# Patient Record
Sex: MALE | Race: WHITE | Employment: OTHER | URBAN - METROPOLITAN AREA
[De-identification: names, ages, dates, MRNs, and addresses within clinical notes are randomized per-mention and may not be internally consistent; named-entity substitution may affect disease eponyms.]

---

## 2021-08-30 ENCOUNTER — HOSPITAL ENCOUNTER (OUTPATIENT)
Dept: CT IMAGING | Age: 66
Discharge: HOME | End: 2021-08-30
Payer: MEDICARE

## 2021-08-30 ENCOUNTER — HOSPITAL ENCOUNTER (OUTPATIENT)
Age: 66
Discharge: HOME | End: 2021-08-30
Payer: MEDICARE

## 2021-08-30 DIAGNOSIS — R31.0 GROSS HEMATURIA: ICD-10-CM

## 2021-08-30 LAB
BUN BLDV-MCNC: 21 MG/DL (ref 7–20)
CREAT SERPL-MCNC: 1.1 MG/DL (ref 0.8–1.3)
GFR AFRICAN AMERICAN: >60
GFR NON-AFRICAN AMERICAN: >60

## 2021-08-30 PROCEDURE — 84520 ASSAY OF UREA NITROGEN: CPT

## 2021-08-30 PROCEDURE — 6360000004 HC RX CONTRAST MEDICATION: Performed by: UROLOGY

## 2021-08-30 PROCEDURE — 36415 COLL VENOUS BLD VENIPUNCTURE: CPT

## 2021-08-30 PROCEDURE — 82565 ASSAY OF CREATININE: CPT

## 2021-08-30 PROCEDURE — 74178 CT ABD&PLV WO CNTR FLWD CNTR: CPT

## 2021-08-30 RX ADMIN — IOPAMIDOL 75 ML: 755 INJECTION, SOLUTION INTRAVENOUS at 07:36

## 2022-02-22 ENCOUNTER — HOSPITAL ENCOUNTER (OUTPATIENT)
Dept: CT IMAGING | Age: 67
Discharge: HOME | End: 2022-02-22
Payer: MEDICARE

## 2022-02-22 DIAGNOSIS — N28.89 URETERITIS: ICD-10-CM

## 2022-02-22 DIAGNOSIS — S37.009A: ICD-10-CM

## 2022-02-22 DIAGNOSIS — I10 ESSENTIAL HYPERTENSION, MALIGNANT: ICD-10-CM

## 2022-02-22 PROCEDURE — 6360000004 HC RX CONTRAST MEDICATION: Performed by: UROLOGY

## 2022-02-22 PROCEDURE — 74170 CT ABD WO CNTRST FLWD CNTRST: CPT

## 2022-02-22 RX ADMIN — IOPAMIDOL 75 ML: 755 INJECTION, SOLUTION INTRAVENOUS at 15:47

## 2022-03-16 ENCOUNTER — ANESTHESIA EVENT (OUTPATIENT)
Dept: OPERATING ROOM | Age: 67
End: 2022-03-16
Payer: MEDICARE

## 2022-03-16 RX ORDER — ASPIRIN 81 MG/1
81 TABLET, CHEWABLE ORAL DAILY
COMMUNITY

## 2022-03-16 NOTE — PROGRESS NOTES

## 2022-03-16 NOTE — PROGRESS NOTES
Kade Contreras Preoperative Screening for Elective Surgery/Invasive Procedures While COVID-19 present in the community     Have you had any of the following symptoms? o Fever, chills  o Cough  o Shortness of breath  o Muscle aches/pain  o Diarrhea  o Abdominal pain, nausea, vomiting  o Loss or decrease in taste and / or smell   Risk of Exposure  o Have you recently been hospitalized for COVID-19 or flu-like illness, if so when?  o Recently diagnosed with COVID-19, if so when?  o Recently tested for COVID-19, if so when?  o Have you been in close contact with a person or family member who currently has or recently had COVID-19? If yes, when and in what context?  o Do you live with anybody who in the last 14 days has had fever, chills, shortness of breath, muscle aches, flu-like illness?  o Do you have any close contacts or family members who are currently in the hospital for COVID-19 or flu-like illness? If yes, assess recent close contact with this person. Indicate if the patient has a positive screen by answering yes to one or more of the above questions. Patients who test positive or screen positive prior to surgery or on the day of surgery should be evaluated in conjunction with the surgeon/proceduralist/anesthesiologist to determine the urgency of the procedure.

## 2022-03-22 ENCOUNTER — ANESTHESIA (OUTPATIENT)
Dept: OPERATING ROOM | Age: 67
End: 2022-03-22
Payer: MEDICARE

## 2022-03-22 ENCOUNTER — HOSPITAL ENCOUNTER (OUTPATIENT)
Age: 67
Setting detail: OUTPATIENT SURGERY
Discharge: HOME | End: 2022-03-22
Attending: UROLOGY | Admitting: UROLOGY
Payer: MEDICARE

## 2022-03-22 VITALS
HEIGHT: 67 IN | OXYGEN SATURATION: 92 % | WEIGHT: 165 LBS | SYSTOLIC BLOOD PRESSURE: 111 MMHG | HEART RATE: 72 BPM | DIASTOLIC BLOOD PRESSURE: 76 MMHG | BODY MASS INDEX: 25.9 KG/M2 | RESPIRATION RATE: 7 BRPM | TEMPERATURE: 97.2 F

## 2022-03-22 VITALS — SYSTOLIC BLOOD PRESSURE: 112 MMHG | OXYGEN SATURATION: 98 % | DIASTOLIC BLOOD PRESSURE: 62 MMHG

## 2022-03-22 PROBLEM — D41.4 NEOPLASM OF UNCERTAIN BEHAVIOR OF LATERAL WALL OF URINARY BLADDER: Status: ACTIVE | Noted: 2022-03-22

## 2022-03-22 PROCEDURE — 2709999900 HC NON-CHARGEABLE SUPPLY: Performed by: UROLOGY

## 2022-03-22 PROCEDURE — 7100000001 HC PACU RECOVERY - ADDTL 15 MIN: Performed by: UROLOGY

## 2022-03-22 PROCEDURE — 3700000001 HC ADD 15 MINUTES (ANESTHESIA): Performed by: UROLOGY

## 2022-03-22 PROCEDURE — 6360000002 HC RX W HCPCS: Performed by: NURSE ANESTHETIST, CERTIFIED REGISTERED

## 2022-03-22 PROCEDURE — 7100000011 HC PHASE II RECOVERY - ADDTL 15 MIN: Performed by: UROLOGY

## 2022-03-22 PROCEDURE — 2500000003 HC RX 250 WO HCPCS: Performed by: NURSE ANESTHETIST, CERTIFIED REGISTERED

## 2022-03-22 PROCEDURE — 88307 TISSUE EXAM BY PATHOLOGIST: CPT

## 2022-03-22 PROCEDURE — 2500000003 HC RX 250 WO HCPCS: Performed by: ANESTHESIOLOGY

## 2022-03-22 PROCEDURE — 3600000004 HC SURGERY LEVEL 4 BASE: Performed by: UROLOGY

## 2022-03-22 PROCEDURE — 6360000002 HC RX W HCPCS: Performed by: UROLOGY

## 2022-03-22 PROCEDURE — 2580000003 HC RX 258: Performed by: ANESTHESIOLOGY

## 2022-03-22 PROCEDURE — 3600000014 HC SURGERY LEVEL 4 ADDTL 15MIN: Performed by: UROLOGY

## 2022-03-22 PROCEDURE — 3700000000 HC ANESTHESIA ATTENDED CARE: Performed by: UROLOGY

## 2022-03-22 PROCEDURE — 7100000010 HC PHASE II RECOVERY - FIRST 15 MIN: Performed by: UROLOGY

## 2022-03-22 PROCEDURE — 7100000000 HC PACU RECOVERY - FIRST 15 MIN: Performed by: UROLOGY

## 2022-03-22 RX ORDER — SODIUM CHLORIDE 0.9 % (FLUSH) 0.9 %
5-40 SYRINGE (ML) INJECTION EVERY 12 HOURS SCHEDULED
Status: DISCONTINUED | OUTPATIENT
Start: 2022-03-22 | End: 2022-03-22 | Stop reason: HOSPADM

## 2022-03-22 RX ORDER — PROPOFOL 10 MG/ML
INJECTION, EMULSION INTRAVENOUS PRN
Status: DISCONTINUED | OUTPATIENT
Start: 2022-03-22 | End: 2022-03-22 | Stop reason: SDUPTHER

## 2022-03-22 RX ORDER — FENTANYL CITRATE 50 UG/ML
INJECTION, SOLUTION INTRAMUSCULAR; INTRAVENOUS PRN
Status: DISCONTINUED | OUTPATIENT
Start: 2022-03-22 | End: 2022-03-22 | Stop reason: SDUPTHER

## 2022-03-22 RX ORDER — ONDANSETRON 2 MG/ML
INJECTION INTRAMUSCULAR; INTRAVENOUS PRN
Status: DISCONTINUED | OUTPATIENT
Start: 2022-03-22 | End: 2022-03-22 | Stop reason: SDUPTHER

## 2022-03-22 RX ORDER — ONDANSETRON 2 MG/ML
4 INJECTION INTRAMUSCULAR; INTRAVENOUS
Status: DISCONTINUED | OUTPATIENT
Start: 2022-03-22 | End: 2022-03-22 | Stop reason: HOSPADM

## 2022-03-22 RX ORDER — SODIUM CHLORIDE 9 MG/ML
25 INJECTION, SOLUTION INTRAVENOUS PRN
Status: DISCONTINUED | OUTPATIENT
Start: 2022-03-22 | End: 2022-03-22 | Stop reason: HOSPADM

## 2022-03-22 RX ORDER — SODIUM CHLORIDE, SODIUM LACTATE, POTASSIUM CHLORIDE, CALCIUM CHLORIDE 600; 310; 30; 20 MG/100ML; MG/100ML; MG/100ML; MG/100ML
INJECTION, SOLUTION INTRAVENOUS CONTINUOUS
Status: DISCONTINUED | OUTPATIENT
Start: 2022-03-22 | End: 2022-03-22 | Stop reason: HOSPADM

## 2022-03-22 RX ORDER — SODIUM CHLORIDE 0.9 % (FLUSH) 0.9 %
10 SYRINGE (ML) INJECTION EVERY 12 HOURS SCHEDULED
Status: DISCONTINUED | OUTPATIENT
Start: 2022-03-22 | End: 2022-03-22 | Stop reason: HOSPADM

## 2022-03-22 RX ORDER — ROCURONIUM BROMIDE 10 MG/ML
INJECTION, SOLUTION INTRAVENOUS PRN
Status: DISCONTINUED | OUTPATIENT
Start: 2022-03-22 | End: 2022-03-22 | Stop reason: SDUPTHER

## 2022-03-22 RX ORDER — MEPERIDINE HYDROCHLORIDE 25 MG/ML
12.5 INJECTION INTRAMUSCULAR; INTRAVENOUS; SUBCUTANEOUS EVERY 5 MIN PRN
Status: DISCONTINUED | OUTPATIENT
Start: 2022-03-22 | End: 2022-03-22 | Stop reason: HOSPADM

## 2022-03-22 RX ORDER — DEXAMETHASONE SODIUM PHOSPHATE 4 MG/ML
INJECTION, SOLUTION INTRA-ARTICULAR; INTRALESIONAL; INTRAMUSCULAR; INTRAVENOUS; SOFT TISSUE PRN
Status: DISCONTINUED | OUTPATIENT
Start: 2022-03-22 | End: 2022-03-22 | Stop reason: SDUPTHER

## 2022-03-22 RX ORDER — SODIUM CHLORIDE 0.9 % (FLUSH) 0.9 %
10 SYRINGE (ML) INJECTION PRN
Status: DISCONTINUED | OUTPATIENT
Start: 2022-03-22 | End: 2022-03-22 | Stop reason: HOSPADM

## 2022-03-22 RX ORDER — LIDOCAINE HYDROCHLORIDE 20 MG/ML
INJECTION, SOLUTION INFILTRATION; PERINEURAL PRN
Status: DISCONTINUED | OUTPATIENT
Start: 2022-03-22 | End: 2022-03-22 | Stop reason: SDUPTHER

## 2022-03-22 RX ORDER — LIDOCAINE HYDROCHLORIDE 10 MG/ML
1 INJECTION, SOLUTION EPIDURAL; INFILTRATION; INTRACAUDAL; PERINEURAL
Status: DISCONTINUED | OUTPATIENT
Start: 2022-03-22 | End: 2022-03-22 | Stop reason: HOSPADM

## 2022-03-22 RX ORDER — SODIUM CHLORIDE 0.9 % (FLUSH) 0.9 %
5-40 SYRINGE (ML) INJECTION PRN
Status: DISCONTINUED | OUTPATIENT
Start: 2022-03-22 | End: 2022-03-22 | Stop reason: HOSPADM

## 2022-03-22 RX ADMIN — LIDOCAINE HYDROCHLORIDE 80 MG: 20 INJECTION, SOLUTION INFILTRATION; PERINEURAL at 07:59

## 2022-03-22 RX ADMIN — PROPOFOL 150 MG: 10 INJECTION, EMULSION INTRAVENOUS at 08:00

## 2022-03-22 RX ADMIN — ONDANSETRON HYDROCHLORIDE 4 MG: 2 INJECTION, SOLUTION INTRAMUSCULAR; INTRAVENOUS at 08:05

## 2022-03-22 RX ADMIN — Medication 2000 MG: at 08:00

## 2022-03-22 RX ADMIN — SUGAMMADEX 100 MG: 100 INJECTION, SOLUTION INTRAVENOUS at 08:33

## 2022-03-22 RX ADMIN — FENTANYL CITRATE 50 MCG: 50 INJECTION INTRAMUSCULAR; INTRAVENOUS at 07:59

## 2022-03-22 RX ADMIN — ROCURONIUM BROMIDE 40 MG: 10 INJECTION, SOLUTION INTRAVENOUS at 08:00

## 2022-03-22 RX ADMIN — FENTANYL CITRATE 25 MCG: 50 INJECTION INTRAMUSCULAR; INTRAVENOUS at 08:29

## 2022-03-22 RX ADMIN — SODIUM CHLORIDE, POTASSIUM CHLORIDE, SODIUM LACTATE AND CALCIUM CHLORIDE: 600; 310; 30; 20 INJECTION, SOLUTION INTRAVENOUS at 06:51

## 2022-03-22 RX ADMIN — SUGAMMADEX 100 MG: 100 INJECTION, SOLUTION INTRAVENOUS at 08:34

## 2022-03-22 RX ADMIN — FAMOTIDINE 20 MG: 10 INJECTION INTRAVENOUS at 07:03

## 2022-03-22 RX ADMIN — DEXAMETHASONE SODIUM PHOSPHATE 4 MG: 4 INJECTION, SOLUTION INTRAMUSCULAR; INTRAVENOUS at 08:05

## 2022-03-22 RX ADMIN — FENTANYL CITRATE 25 MCG: 50 INJECTION INTRAMUSCULAR; INTRAVENOUS at 08:17

## 2022-03-22 RX ADMIN — SODIUM CHLORIDE, POTASSIUM CHLORIDE, SODIUM LACTATE AND CALCIUM CHLORIDE: 600; 310; 30; 20 INJECTION, SOLUTION INTRAVENOUS at 07:55

## 2022-03-22 ASSESSMENT — PULMONARY FUNCTION TESTS
PIF_VALUE: 18
PIF_VALUE: 23
PIF_VALUE: 18
PIF_VALUE: 20
PIF_VALUE: 11
PIF_VALUE: 1
PIF_VALUE: 2
PIF_VALUE: 23
PIF_VALUE: 18
PIF_VALUE: 1
PIF_VALUE: 1
PIF_VALUE: 17
PIF_VALUE: 21
PIF_VALUE: 18
PIF_VALUE: 18
PIF_VALUE: 0
PIF_VALUE: 21
PIF_VALUE: 18
PIF_VALUE: 18
PIF_VALUE: 1
PIF_VALUE: 2
PIF_VALUE: 2
PIF_VALUE: 20
PIF_VALUE: 18
PIF_VALUE: 1
PIF_VALUE: 19
PIF_VALUE: 14
PIF_VALUE: 18
PIF_VALUE: 23
PIF_VALUE: 0
PIF_VALUE: 19
PIF_VALUE: 18
PIF_VALUE: 19
PIF_VALUE: 18
PIF_VALUE: 20
PIF_VALUE: 18
PIF_VALUE: 5
PIF_VALUE: 17
PIF_VALUE: 19
PIF_VALUE: 18
PIF_VALUE: 21

## 2022-03-22 ASSESSMENT — ENCOUNTER SYMPTOMS: SHORTNESS OF BREATH: 1

## 2022-03-22 ASSESSMENT — PAIN - FUNCTIONAL ASSESSMENT: PAIN_FUNCTIONAL_ASSESSMENT: 0-10

## 2022-03-22 ASSESSMENT — LIFESTYLE VARIABLES: SMOKING_STATUS: 1

## 2022-03-22 NOTE — PROGRESS NOTES
Reported to Dr Padilla Standard pt's heart rhythm strip has ST depression but in NSR. Pt asymptomatic and denies chest pain. Compared with previous EKG. No new orders received. Will continue to monitor.

## 2022-03-22 NOTE — ANESTHESIA POSTPROCEDURE EVALUATION
Department of Anesthesiology  Postprocedure Note    Patient: Sonia Li  MRN: [id number]  YOB: 1955  Date of evaluation: 3/22/2022  Time:  10:01 AM     Procedure Summary     Date: 03/22/22 Room / Location: [address] [place] [address]    Anesthesia Start: 3760 Anesthesia Stop: Meredith Felling    Procedure: CYSTOSCOPY TRANSURETHRAL RESECTION BLADDER TUMOR (N/A Bladder) Diagnosis: (GROSS HEMATURIA)    Surgeons: Sosa Richard MD Responsible Provider: Arron Pina MD    Anesthesia Type: general ASA Status: 2          Anesthesia Type: general    Connor Phase I: Connor Score: 10    Connor Phase II: Connor Score: 10    Last vitals: Reviewed and per EMR flowsheets.      Vitals:    03/22/22 0909 03/22/22 0913 03/22/22 0919 03/22/22 0943   BP:  135/87 (!) 127/94 111/76   Pulse: 64 63 60 72   Resp:  15 11 (!) 7   Temp:       TempSrc:       SpO2:  98% 98% 92%   Weight:       Height:         Anesthesia Post Evaluation    Patient location during evaluation: bedside  Patient participation: complete - patient participated  Level of consciousness: awake and alert  Airway patency: patent  Nausea & Vomiting: no nausea  Complications: no  Cardiovascular status: hemodynamically stable  Respiratory status: acceptable  Hydration status: euvolemic

## 2022-03-22 NOTE — INTERVAL H&P NOTE
Update History & Physical    The patient's History and Physical was reviewed with the patient and I examined the patient. There was no change. The surgical site was confirmed by the patient and me. Plan: The risks, benefits, expected outcome, and alternative to the recommended procedure have been discussed with the patient. Patient understands and wants to proceed with the procedure.      R/b/a cysto, turbt (see office notes as well)    Electronically signed by Leatha Knott MD on 3/22/2022 at 7:57 AM

## 2022-03-22 NOTE — ANESTHESIA PRE PROCEDURE
Department of Anesthesiology  Preprocedure Note       Name:  Melquiades Combs   Age:  73 y.o.  :  1955                                          MRN:  [id number]         Date:  3/22/2022      Surgeon: Eliza Quigley):  Le Rand MD    Procedure: Procedure(s):  CYSTOSCOPY TRANSURETHRAL RESECTION BLADDER TUMOR    Medications prior to admission:   Prior to Admission medications    Medication Sig Start Date End Date Taking? Authorizing Provider   aspirin 81 MG chewable tablet Take 81 mg by mouth daily   Yes Historical Provider, MD   simvastatin (ZOCOR) 20 MG tablet Take 20 mg by mouth nightly    Historical Provider, MD   carvedilol (COREG) 6.25 MG tablet Take 6.25 mg by mouth 2 times daily (with meals). Historical Provider, MD   nitroGLYCERIN (NITROSTAT) 0.4 MG SL tablet Place 0.4 mg under the tongue every 5 minutes as needed. Patient not taking: Reported on 3/22/2022    Historical Provider, MD   lisinopril (PRINIVIL;ZESTRIL) 10 MG tablet Take 40 mg by mouth daily     Historical Provider, MD       Current medications:    Current Facility-Administered Medications   Medication Dose Route Frequency Provider Last Rate Last Admin    ceFAZolin (ANCEF) 2000 mg in sterile water 20 mL IV syringe  2,000 mg IntraVENous Once Le Rand MD        lidocaine PF 1 % injection 1 mL  1 mL IntraDERmal Once PRN Amy Ramires MD        lactated ringers infusion   IntraVENous Continuous Amy Ramires  mL/hr at 22 0651 New Bag at 22 0651    sodium chloride flush 0.9 % injection 10 mL  10 mL IntraVENous 2 times per day Amy Ramires MD        sodium chloride flush 0.9 % injection 10 mL  10 mL IntraVENous PRN Amy Ramires MD        0.9 % sodium chloride infusion  25 mL IntraVENous PRN Amy Ramires MD           Allergies:  No Known Allergies    Problem List:  There is no problem list on file for this patient.       Past Medical History:        Diagnosis Date    Hyperlipidemia     Hypertension     MI (myocardial infarction) ([facility] [address] 2008       Past Surgical History:        Procedure Laterality Date    APPENDECTOMY      CORONARY ARTERY BYPASS GRAFT  2008    HERNIA REPAIR         Social History:    Social History     Tobacco Use    Smoking status: Light Tobacco Smoker     Types: Cigarettes    Smokeless tobacco: Current User    Tobacco comment: occassional    Substance Use Topics    Alcohol use: Yes     Comment: occassional                                 Ready to quit: Not Answered  Counseling given: Not Answered  Comment: occassional       Vital Signs (Current):   Vitals:    03/16/22 1108 03/22/22 0640   BP:  121/76   Pulse:  70   Resp:  20   Temp:  98 °F (36.7 °C)   TempSrc:  Infrared   SpO2:  98%   Weight: 165 lb (74.8 kg)    Height: 5' 7\" (1.702 m)                                               BP Readings from Last 3 Encounters:   03/22/22 121/76   04/20/18 122/80       NPO Status: Time of last liquid consumption: 2100                        Time of last solid consumption: 2100                        Date of last liquid consumption: 03/21/22                        Date of last solid food consumption: 03/21/22    BMI:   Wt Readings from Last 3 Encounters:   03/16/22 165 lb (74.8 kg)   04/20/18 170 lb (77.1 kg)   01/11/11 167 lb (75.8 kg)     Body mass index is 25.84 kg/m².     CBC:   Lab Results   Component Value Date    WBC 7.1 04/20/2018    RBC 4.32 04/20/2018    HGB 14.9 04/20/2018    HCT 44.1 04/20/2018    .0 04/20/2018    RDW 13.5 04/20/2018     04/20/2018       CMP:   Lab Results   Component Value Date     04/20/2018    K 4.5 04/20/2018     04/20/2018    CO2 27 04/20/2018    BUN 21 08/30/2021    CREATININE 1.1 08/30/2021    GFRAA >60 08/30/2021    GFRAA >60 01/29/2012    AGRATIO 1.8 04/20/2018    LABGLOM >60 08/30/2021    GLUCOSE 105 04/20/2018    PROT 7.1 04/20/2018    PROT 7.3 01/29/2012    CALCIUM 9.2 04/20/2018    BILITOT <0.2 04/20/2018    ALKPHOS 88 04/20/2018    AST 16 04/20/2018    ALT 19 04/20/2018       POC Tests: No results for input(s): POCGLU, POCNA, POCK, POCCL, POCBUN, POCHEMO, POCHCT in the last 72 hours. Coags:   Lab Results   Component Value Date    PROTIME 10.3 04/20/2018    INR 0.91 04/20/2018       HCG (If Applicable): No results found for: PREGTESTUR, PREGSERUM, HCG, HCGQUANT     ABGs: No results found for: PHART, PO2ART, VAN3DGV, NHU8TDG, BEART, C3WLNUDY     Type & Screen (If Applicable):  No results found for: LABABO, LABRH    Drug/Infectious Status (If Applicable):  No results found for: HIV, HEPCAB    COVID-19 Screening (If Applicable): No results found for: COVID19        Anesthesia Evaluation  Patient summary reviewed no history of anesthetic complications:   Airway: Mallampati: II  TM distance: >3 FB   Neck ROM: full  Mouth opening: > = 3 FB Dental:    (+) lower dentures and upper dentures      Pulmonary: breath sounds clear to auscultation  (+) COPD (NO O2 REQ., NO INHALERS):  shortness of breath (EXERT):  current smoker (1+ PPD)    (-) asthma and sleep apnea          Patient did not smoke on day of surgery. Cardiovascular:    (+) hypertension:, past MI (2008): > 6 months, CAD: obstructive, CABG/stent (3V CABG, 2008):, LOZADA:,     (-) pacemaker, dysrhythmias,  angina and  CHF    ECG reviewed  Rhythm: regular  Rate: normal           Beta Blocker:  Dose within 24 Hrs         Neuro/Psych:      (-) seizures, TIA, CVA and psychiatric history           GI/Hepatic/Renal:   (+) renal disease (BENIGN RENAL MASS, BX'D //  HEMATURIA):,      (-) GERD, liver disease, bowel prep and no morbid obesity       Endo/Other:    (+) : arthritis:., no malignancy/cancer. (-) diabetes mellitus, hypothyroidism, hyperthyroidism, blood dyscrasia, no malignancy/cancer               Abdominal:       Abdomen: soft. Vascular: negative vascular ROS.          Other Findings:             Anesthesia Plan      general     ASA 2       Induction: intravenous. MIPS: Postoperative opioids intended and Prophylactic antiemetics administered. Anesthetic plan and risks discussed with patient. Plan discussed with CRNA. This pre-anesthesia assessment may be used as a history and physical.    DOS STAFF ADDENDUM:    Pt seen and examined, chart reviewed (including anesthesia, drug and allergy history). No interval changes to history and physical examination. Anesthetic plan, risks, benefits, alternatives, and personnel involved discussed with patient. Patient verbalized an understanding and agrees to proceed.       Ana Cobb MD  March 22, 2022  6:57 AM      Ana Cobb MD   3/22/2022

## 2022-03-22 NOTE — PROGRESS NOTES
Received report from Memolane, DIAN and Castro Mejia RN. VSS with Sp02 98% on r/a. Pt awake and denies pain and nausea. Will continue to monitor.

## 2022-03-22 NOTE — OP NOTE
Operative Note      Patient: Tayler Saucedo  YOB: 1955  MRN: [id number]    Date of Procedure: 3/22/2022    Pre-Operative Diagnosis:  Hist of abnormal CT, gross hematuria  Post-Operative Diagnosis: same     Procedure Performed:   1. Transurethral resection of bladder tumor (medium ~3cm total surface area)        Surgeon:  Alejandra Ashley MD    Assistant: Scrub    Anesthesia: General    Drains/Tubes: none  Specimens: (1) superficial and deep bladder tissue  Intravenous Fluids: 500 mL   Estimated Blood Loss: 10 mL  Complications: None    Findings: 3 cm papillary bladder tumor just lateral to right UO. I saw 1 total tumors. It appeared low grade grade. The anterior urethra was within normal limits. Prostatic urethra showed some mild hypertrophy, small median bar  STEF - nl    Indications:  Hist of renal lesion, stable after fall. Bladder mass seen on ct and gross hematuria. Risks, benefits, alternatives of the procedure were reviewed. See the office notes for further details. The patient signed the consent prior to surgery. Description of Procedure:  After obtaining informed consent, the patient was brought to the operating room and placed supine on the operating room table. After adequate anesthesia, he was then repositioned in the dorsal lithotomy position and prepped and draped in the standard surgical fashion. I began the case by first doing rigid cystoscopy with a 21-Frisian sheath and a 30 degree lens. The anterior urethra was within normal limits. The prostatic urethra showed some hypertrophy. Once in the bladder,  His ureteral orifices were in normal orthotopic position. l found a tumor around 3cm in size as above. I also examined the bladder with a 70 degree lens and did not find any additional tumors or other pathology. The cystoscope was then removed and exchanged for  the resectoscope. I used the wire loop as my working element.  I began resection of the bladder tumor using monopolar electrocautery at a setting of 60 for the cutting current and 50 for coagulation current. The superficial aspects of the tumor were debulked with the resectoscope  And then I performed deeper tissue resection in order to get underneath the tumor and to sample muscle tissue. I then focused on using cautery to fulgurate the tumor base to minimize the chance of post-operative bleeding. There was no active bleeding at the conclusion of the case. The patient was then awakened from anesthesia and brought to the PACU in stable condition. There were no apparent complications.         Follow up:   - 1-2 weeks for postop check and pathology results       Electronically signed by Mirela Esposito MD on 3/22/2022 at 11:37 AM

## 2022-08-05 ENCOUNTER — HOSPITAL ENCOUNTER (OUTPATIENT)
Dept: ULTRASOUND IMAGING | Age: 67
Discharge: HOME | End: 2022-08-05
Payer: MEDICARE

## 2022-08-05 DIAGNOSIS — R10.11 ABDOMINAL PAIN, RUQ (RIGHT UPPER QUADRANT): ICD-10-CM

## 2022-08-05 PROCEDURE — 76705 ECHO EXAM OF ABDOMEN: CPT

## 2022-10-19 ENCOUNTER — ANESTHESIA EVENT (OUTPATIENT)
Dept: OPERATING ROOM | Age: 67
End: 2022-10-19
Payer: MEDICARE

## 2022-10-19 ENCOUNTER — APPOINTMENT (OUTPATIENT)
Dept: GENERAL RADIOLOGY | Age: 67
End: 2022-10-19
Attending: ORTHOPAEDIC SURGERY
Payer: MEDICARE

## 2022-10-19 ENCOUNTER — ANESTHESIA (OUTPATIENT)
Dept: OPERATING ROOM | Age: 67
End: 2022-10-19
Payer: MEDICARE

## 2022-10-19 ENCOUNTER — HOSPITAL ENCOUNTER (OUTPATIENT)
Age: 67
Setting detail: OUTPATIENT SURGERY
Discharge: HOME | End: 2022-10-19
Attending: ORTHOPAEDIC SURGERY | Admitting: ORTHOPAEDIC SURGERY
Payer: MEDICARE

## 2022-10-19 VITALS
WEIGHT: 155 LBS | HEIGHT: 67 IN | HEART RATE: 69 BPM | SYSTOLIC BLOOD PRESSURE: 132 MMHG | BODY MASS INDEX: 24.33 KG/M2 | TEMPERATURE: 96.2 F | RESPIRATION RATE: 15 BRPM | DIASTOLIC BLOOD PRESSURE: 75 MMHG | OXYGEN SATURATION: 99 %

## 2022-10-19 DIAGNOSIS — S82.841A CLOSED BIMALLEOLAR FRACTURE OF RIGHT ANKLE, INITIAL ENCOUNTER: Primary | ICD-10-CM

## 2022-10-19 PROCEDURE — 2580000003 HC RX 258: Performed by: ORTHOPAEDIC SURGERY

## 2022-10-19 PROCEDURE — 3600000004 HC SURGERY LEVEL 4 BASE: Performed by: ORTHOPAEDIC SURGERY

## 2022-10-19 PROCEDURE — 7100000011 HC PHASE II RECOVERY - ADDTL 15 MIN: Performed by: ORTHOPAEDIC SURGERY

## 2022-10-19 PROCEDURE — 6360000002 HC RX W HCPCS

## 2022-10-19 PROCEDURE — 3600000014 HC SURGERY LEVEL 4 ADDTL 15MIN: Performed by: ORTHOPAEDIC SURGERY

## 2022-10-19 PROCEDURE — 73600 X-RAY EXAM OF ANKLE: CPT

## 2022-10-19 PROCEDURE — 6370000000 HC RX 637 (ALT 250 FOR IP): Performed by: ORTHOPAEDIC SURGERY

## 2022-10-19 PROCEDURE — 6360000002 HC RX W HCPCS: Performed by: ORTHOPAEDIC SURGERY

## 2022-10-19 PROCEDURE — 2720000010 HC SURG SUPPLY STERILE: Performed by: ORTHOPAEDIC SURGERY

## 2022-10-19 PROCEDURE — 6370000000 HC RX 637 (ALT 250 FOR IP)

## 2022-10-19 PROCEDURE — 3700000001 HC ADD 15 MINUTES (ANESTHESIA): Performed by: ORTHOPAEDIC SURGERY

## 2022-10-19 PROCEDURE — 7100000000 HC PACU RECOVERY - FIRST 15 MIN: Performed by: ORTHOPAEDIC SURGERY

## 2022-10-19 PROCEDURE — 6360000002 HC RX W HCPCS: Performed by: ANESTHESIOLOGY

## 2022-10-19 PROCEDURE — 3700000000 HC ANESTHESIA ATTENDED CARE: Performed by: ORTHOPAEDIC SURGERY

## 2022-10-19 PROCEDURE — A4217 STERILE WATER/SALINE, 500 ML: HCPCS | Performed by: ORTHOPAEDIC SURGERY

## 2022-10-19 PROCEDURE — 7100000001 HC PACU RECOVERY - ADDTL 15 MIN: Performed by: ORTHOPAEDIC SURGERY

## 2022-10-19 PROCEDURE — 2500000003 HC RX 250 WO HCPCS

## 2022-10-19 PROCEDURE — 2500000003 HC RX 250 WO HCPCS: Performed by: ANESTHESIOLOGY

## 2022-10-19 PROCEDURE — 2580000003 HC RX 258: Performed by: ANESTHESIOLOGY

## 2022-10-19 PROCEDURE — 3209999900 FLUORO FOR SURGICAL PROCEDURES

## 2022-10-19 PROCEDURE — 64447 NJX AA&/STRD FEMORAL NRV IMG: CPT | Performed by: ANESTHESIOLOGY

## 2022-10-19 PROCEDURE — C1713 ANCHOR/SCREW BN/BN,TIS/BN: HCPCS | Performed by: ORTHOPAEDIC SURGERY

## 2022-10-19 PROCEDURE — 2709999900 HC NON-CHARGEABLE SUPPLY: Performed by: ORTHOPAEDIC SURGERY

## 2022-10-19 PROCEDURE — 7100000010 HC PHASE II RECOVERY - FIRST 15 MIN: Performed by: ORTHOPAEDIC SURGERY

## 2022-10-19 DEVICE — 4.0MM PARTIALLY THREADED                                    CANNULATED SCREW 46MM: Type: IMPLANTABLE DEVICE | Site: ANKLE | Status: FUNCTIONAL

## 2022-10-19 RX ORDER — ONDANSETRON 4 MG/1
4 TABLET, FILM COATED ORAL EVERY 4 HOURS PRN
Qty: 20 TABLET | Refills: 0 | Status: SHIPPED | OUTPATIENT
Start: 2022-10-19

## 2022-10-19 RX ORDER — SODIUM CHLORIDE 9 MG/ML
INJECTION, SOLUTION INTRAVENOUS PRN
Status: DISCONTINUED | OUTPATIENT
Start: 2022-10-19 | End: 2022-10-19 | Stop reason: HOSPADM

## 2022-10-19 RX ORDER — DROPERIDOL 2.5 MG/ML
0.62 INJECTION, SOLUTION INTRAMUSCULAR; INTRAVENOUS
Status: DISCONTINUED | OUTPATIENT
Start: 2022-10-19 | End: 2022-10-19 | Stop reason: HOSPADM

## 2022-10-19 RX ORDER — ONDANSETRON 2 MG/ML
INJECTION INTRAMUSCULAR; INTRAVENOUS PRN
Status: DISCONTINUED | OUTPATIENT
Start: 2022-10-19 | End: 2022-10-19 | Stop reason: SDUPTHER

## 2022-10-19 RX ORDER — SODIUM CHLORIDE 0.9 % (FLUSH) 0.9 %
5-40 SYRINGE (ML) INJECTION PRN
Status: DISCONTINUED | OUTPATIENT
Start: 2022-10-19 | End: 2022-10-19 | Stop reason: HOSPADM

## 2022-10-19 RX ORDER — ONDANSETRON 2 MG/ML
4 INJECTION INTRAMUSCULAR; INTRAVENOUS
Status: DISCONTINUED | OUTPATIENT
Start: 2022-10-19 | End: 2022-10-19 | Stop reason: HOSPADM

## 2022-10-19 RX ORDER — MIDAZOLAM HYDROCHLORIDE 1 MG/ML
INJECTION INTRAMUSCULAR; INTRAVENOUS
Status: COMPLETED
Start: 2022-10-19 | End: 2022-10-19

## 2022-10-19 RX ORDER — DEXAMETHASONE SODIUM PHOSPHATE 10 MG/ML
INJECTION INTRAMUSCULAR; INTRAVENOUS PRN
Status: DISCONTINUED | OUTPATIENT
Start: 2022-10-19 | End: 2022-10-19 | Stop reason: SDUPTHER

## 2022-10-19 RX ORDER — MIDAZOLAM HYDROCHLORIDE 1 MG/ML
INJECTION INTRAMUSCULAR; INTRAVENOUS PRN
Status: DISCONTINUED | OUTPATIENT
Start: 2022-10-19 | End: 2022-10-19 | Stop reason: SDUPTHER

## 2022-10-19 RX ORDER — IBUPROFEN 200 MG
400 TABLET ORAL
Status: DISCONTINUED | OUTPATIENT
Start: 2022-10-19 | End: 2022-10-19 | Stop reason: HOSPADM

## 2022-10-19 RX ORDER — OXYCODONE HYDROCHLORIDE 5 MG/1
5 TABLET ORAL EVERY 30 MIN PRN
Status: DISCONTINUED | OUTPATIENT
Start: 2022-10-19 | End: 2022-10-19 | Stop reason: HOSPADM

## 2022-10-19 RX ORDER — SODIUM CHLORIDE, SODIUM LACTATE, POTASSIUM CHLORIDE, CALCIUM CHLORIDE 600; 310; 30; 20 MG/100ML; MG/100ML; MG/100ML; MG/100ML
INJECTION, SOLUTION INTRAVENOUS CONTINUOUS
Status: DISCONTINUED | OUTPATIENT
Start: 2022-10-19 | End: 2022-10-19 | Stop reason: HOSPADM

## 2022-10-19 RX ORDER — KETOROLAC TROMETHAMINE 30 MG/ML
INJECTION, SOLUTION INTRAMUSCULAR; INTRAVENOUS
Status: COMPLETED
Start: 2022-10-19 | End: 2022-10-19

## 2022-10-19 RX ORDER — EPHEDRINE SULFATE 50 MG/ML
INJECTION INTRAVENOUS PRN
Status: DISCONTINUED | OUTPATIENT
Start: 2022-10-19 | End: 2022-10-19 | Stop reason: SDUPTHER

## 2022-10-19 RX ORDER — SODIUM CHLORIDE 0.9 % (FLUSH) 0.9 %
5-40 SYRINGE (ML) INJECTION EVERY 12 HOURS SCHEDULED
Status: DISCONTINUED | OUTPATIENT
Start: 2022-10-19 | End: 2022-10-19 | Stop reason: HOSPADM

## 2022-10-19 RX ORDER — FENTANYL CITRATE 50 UG/ML
INJECTION, SOLUTION INTRAMUSCULAR; INTRAVENOUS PRN
Status: DISCONTINUED | OUTPATIENT
Start: 2022-10-19 | End: 2022-10-19 | Stop reason: SDUPTHER

## 2022-10-19 RX ORDER — PROPOFOL 10 MG/ML
INJECTION, EMULSION INTRAVENOUS PRN
Status: DISCONTINUED | OUTPATIENT
Start: 2022-10-19 | End: 2022-10-19 | Stop reason: SDUPTHER

## 2022-10-19 RX ORDER — KETOROLAC TROMETHAMINE 30 MG/ML
15 INJECTION, SOLUTION INTRAMUSCULAR; INTRAVENOUS ONCE
Status: COMPLETED | OUTPATIENT
Start: 2022-10-19 | End: 2022-10-19

## 2022-10-19 RX ORDER — DOCUSATE SODIUM 100 MG/1
100 CAPSULE, LIQUID FILLED ORAL 2 TIMES DAILY PRN
Qty: 20 CAPSULE | Refills: 0 | Status: SHIPPED | OUTPATIENT
Start: 2022-10-19 | End: 2022-11-01 | Stop reason: ALTCHOICE

## 2022-10-19 RX ORDER — ACETAMINOPHEN 500 MG
1000 TABLET ORAL
Status: DISCONTINUED | OUTPATIENT
Start: 2022-10-19 | End: 2022-10-19 | Stop reason: HOSPADM

## 2022-10-19 RX ORDER — BUPIVACAINE HYDROCHLORIDE 5 MG/ML
INJECTION, SOLUTION EPIDURAL; INTRACAUDAL
Status: COMPLETED | OUTPATIENT
Start: 2022-10-19 | End: 2022-10-19

## 2022-10-19 RX ORDER — FENTANYL CITRATE 50 UG/ML
INJECTION, SOLUTION INTRAMUSCULAR; INTRAVENOUS
Status: COMPLETED
Start: 2022-10-19 | End: 2022-10-19

## 2022-10-19 RX ORDER — MEPERIDINE HYDROCHLORIDE 50 MG/ML
12.5 INJECTION INTRAMUSCULAR; INTRAVENOUS; SUBCUTANEOUS EVERY 5 MIN PRN
Status: DISCONTINUED | OUTPATIENT
Start: 2022-10-19 | End: 2022-10-19 | Stop reason: HOSPADM

## 2022-10-19 RX ORDER — MAGNESIUM HYDROXIDE 1200 MG/15ML
LIQUID ORAL CONTINUOUS PRN
Status: COMPLETED | OUTPATIENT
Start: 2022-10-19 | End: 2022-10-19

## 2022-10-19 RX ORDER — OXYCODONE HYDROCHLORIDE AND ACETAMINOPHEN 5; 325 MG/1; MG/1
1 TABLET ORAL EVERY 6 HOURS PRN
Qty: 28 TABLET | Refills: 0 | Status: SHIPPED | OUTPATIENT
Start: 2022-10-19 | End: 2022-10-26

## 2022-10-19 RX ORDER — ACETAMINOPHEN 500 MG
1000 TABLET ORAL ONCE
Status: COMPLETED | OUTPATIENT
Start: 2022-10-19 | End: 2022-10-19

## 2022-10-19 RX ORDER — OXYCODONE HYDROCHLORIDE 5 MG/1
TABLET ORAL
Status: COMPLETED
Start: 2022-10-19 | End: 2022-10-19

## 2022-10-19 RX ADMIN — KETOROLAC TROMETHAMINE 15 MG: 30 INJECTION, SOLUTION INTRAMUSCULAR; INTRAVENOUS at 14:01

## 2022-10-19 RX ADMIN — MIDAZOLAM HYDROCHLORIDE 2 MG: 2 INJECTION, SOLUTION INTRAMUSCULAR; INTRAVENOUS at 12:01

## 2022-10-19 RX ADMIN — FENTANYL CITRATE 100 MCG: 50 INJECTION INTRAMUSCULAR; INTRAVENOUS at 12:01

## 2022-10-19 RX ADMIN — BUPIVACAINE HYDROCHLORIDE 20 ML: 5 INJECTION, SOLUTION EPIDURAL; INTRACAUDAL; PERINEURAL at 12:05

## 2022-10-19 RX ADMIN — OXYCODONE HYDROCHLORIDE 5 MG: 5 TABLET ORAL at 14:18

## 2022-10-19 RX ADMIN — ACETAMINOPHEN 1000 MG: 500 TABLET ORAL at 11:32

## 2022-10-19 RX ADMIN — PROPOFOL 200 MG: 10 INJECTION, EMULSION INTRAVENOUS at 12:37

## 2022-10-19 RX ADMIN — ONDANSETRON 4 MG: 2 INJECTION INTRAMUSCULAR; INTRAVENOUS at 12:49

## 2022-10-19 RX ADMIN — EPHEDRINE SULFATE 10 MG: 50 INJECTION INTRAVENOUS at 12:50

## 2022-10-19 RX ADMIN — CEFAZOLIN 2000 MG: 10 INJECTION, POWDER, FOR SOLUTION INTRAVENOUS at 12:40

## 2022-10-19 RX ADMIN — PROPOFOL 50 MG: 10 INJECTION, EMULSION INTRAVENOUS at 12:44

## 2022-10-19 RX ADMIN — SODIUM CHLORIDE, POTASSIUM CHLORIDE, SODIUM LACTATE AND CALCIUM CHLORIDE: 600; 310; 30; 20 INJECTION, SOLUTION INTRAVENOUS at 11:26

## 2022-10-19 RX ADMIN — DEXAMETHASONE SODIUM PHOSPHATE 5 MG: 10 INJECTION INTRAMUSCULAR; INTRAVENOUS at 12:49

## 2022-10-19 RX ADMIN — EPHEDRINE SULFATE 10 MG: 50 INJECTION INTRAVENOUS at 12:42

## 2022-10-19 RX ADMIN — OXYCODONE 5 MG: 5 TABLET ORAL at 14:18

## 2022-10-19 RX ADMIN — EPHEDRINE SULFATE 20 MG: 50 INJECTION INTRAVENOUS at 13:11

## 2022-10-19 ASSESSMENT — PAIN DESCRIPTION - LOCATION
LOCATION: ANKLE

## 2022-10-19 ASSESSMENT — PAIN DESCRIPTION - DESCRIPTORS: DESCRIPTORS: JABBING;SHOOTING

## 2022-10-19 ASSESSMENT — PAIN DESCRIPTION - ORIENTATION
ORIENTATION: RIGHT

## 2022-10-19 ASSESSMENT — PAIN SCALES - GENERAL: PAINLEVEL_OUTOF10: 5

## 2022-10-19 ASSESSMENT — LIFESTYLE VARIABLES: SMOKING_STATUS: 1

## 2022-10-19 ASSESSMENT — PAIN - FUNCTIONAL ASSESSMENT: PAIN_FUNCTIONAL_ASSESSMENT: 0-10

## 2022-10-19 NOTE — PROGRESS NOTES
Olin Alberta    Age 78 y.o.    male    1955    MRN [id number]    10/19/2022  Arrival Time_____________  OR Time____________[address]     Procedure(s):  OPEN REDUCTION INTERNAL FIXATION RIGHT MEDIAL MALLEOLUS                      General    Surgeon(s):  Nicolas Teodoro, MD       Phone [phone] ([place])     [address]  Cell         Work  _____________________________________________________________________  _____________________________________________________________________  _____________________________________________________________________  _____________________________________________________________________  _____________________________________________________________________    PCP _____________________________ Phone_________________     H&P__________________Bringing      Chart            Epic   DOS      Called________  EKG__________________Bringing      Chart            Epic   DOS      Called________  LAB__________________ Bringing      Chart            Epic   DOS      Called________  Cardiac Clearance_______Bringing      Chart            Epic      DOS      Called________    Cardiologist________________________ Phone___________________________    ? Confucianist concerns / Waiver on Chart            PAT Communications________________  ? Pre-op Instructions Given [place]          _________________________________  ? Directions to Surgery Center                          _________________________________  ? Transportation Home_______________      __________________________________  ?  Crutches/Walker__________________        __________________________________    ________Pre-op Orders   _______Transcribed    _______Wt.  ________Pharmacy          _______SCD  ______VTE     ______TED [place]  _______  Surgery Consent    _______  Anesthesia Consent         COVID DATE______________LOCATION________________ RESULT__________

## 2022-10-19 NOTE — ANESTHESIA PRE PROCEDURE
Department of Anesthesiology  Preprocedure Note       Name:  Cayla Reeves   Age:  78 y.o.  :  1955                                          MRN:  [id number]         Date:  10/19/2022      Surgeon: Penelope Zaman):  Lydia King MD    Procedure: Procedure(s):  OPEN REDUCTION INTERNAL FIXATION RIGHT MEDIAL MALLEOLUS    Medications prior to admission:   Prior to Admission medications    Medication Sig Start Date End Date Taking? Authorizing Provider   aspirin 81 MG chewable tablet Take 81 mg by mouth daily    Historical Provider, MD   simvastatin (ZOCOR) 20 MG tablet Take 20 mg by mouth nightly    Historical Provider, MD   carvedilol (COREG) 6.25 MG tablet Take 6.25 mg by mouth 2 times daily (with meals). Historical Provider, MD   nitroGLYCERIN (NITROSTAT) 0.4 MG SL tablet Place 0.4 mg under the tongue every 5 minutes as needed.   Patient not taking: No sig reported    Historical Provider, MD   lisinopril (PRINIVIL;ZESTRIL) 10 MG tablet Take 40 mg by mouth daily     Historical Provider, MD       Current medications:    Current Facility-Administered Medications   Medication Dose Route Frequency Provider Last Rate Last Admin    midazolam (VERSED) 2 MG/2ML injection             fentaNYL (SUBLIMAZE) 100 MCG/2ML injection             lactated ringers infusion   IntraVENous Continuous Lenore Berry  mL/hr at 10/19/22 1126 New Bag at 10/19/22 1126    sodium chloride flush 0.9 % injection 5-40 mL  5-40 mL IntraVENous 2 times per day Lenore Berry MD        sodium chloride flush 0.9 % injection 5-40 mL  5-40 mL IntraVENous PRN Lenore Berry MD        0.9 % sodium chloride infusion   IntraVENous PRN Lenore Berry MD        ceFAZolin (ANCEF) 2000 mg in dextrose 5 % 100 mL IVPB  2,000 mg IntraVENous On Call to OR Lydia King MD           Allergies:  No Known Allergies    Problem List:    Patient Active Problem List   Diagnosis Code    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       Past Medical History:        Diagnosis Date    Hyperlipidemia     Hypertension     [facility] ([facility]ocardial infarction) ([facility] [address] 2008       Past Surgical History:        Procedure Laterality Date    APPENDECTOMY      CORONARY ARTERY BYPASS GRAFT  2008    CYSTOSCOPY N/A 3/22/2022    CYSTOSCOPY TRANSURETHRAL RESECTION BLADDER TUMOR performed by Sheldon Bowman MD at [address] [place] [address]         Social History:    Social History     Tobacco Use    Smoking status: Light Smoker     Types: Cigarettes    Smokeless tobacco: Current    Tobacco comments:     occassional    Substance Use Topics    Alcohol use: Yes     Comment: occassional                                 Ready to quit: Not Answered  Counseling given: Not Answered  Tobacco comments: occassional       Vital Signs (Current):   Vitals:    10/19/22 1115   BP: 117/74   Pulse: 57   Resp: 20   Temp: 96.8 °F (36 °C)   TempSrc: Temporal   SpO2: 99%   Weight: 155 lb (70.3 kg)   Height: 5' 7\" (1.702 m)                                              BP Readings from Last 3 Encounters:   10/19/22 117/74   03/22/22 112/62   03/22/22 111/76       NPO Status: Time of last liquid consumption: 0700 (pt took sip water with meds)                        Time of last solid consumption: 2200                        Date of last liquid consumption: 10/19/22                        Date of last solid food consumption: 10/18/22    BMI:   Wt Readings from Last 3 Encounters:   10/19/22 155 lb (70.3 kg)   03/16/22 165 lb (74.8 kg)   04/20/18 170 lb (77.1 kg)     Body mass index is 24.28 kg/m².     CBC:   Lab Results   Component Value Date/Time    WBC 7.1 04/20/2018 08:22 AM    RBC 4.32 04/20/2018 08:22 AM    HGB 14.9 04/20/2018 08:22 AM    HCT 44.1 04/20/2018 08:22 AM    .0 04/20/2018 08:22 AM    RDW 13.5 04/20/2018 08:22 AM     04/20/2018 08:22 AM       CMP:   Lab Results   Component Value Date/Time     04/20/2018 08:22 AM    K 4.5 04/20/2018 08:22 AM  04/20/2018 08:22 AM    CO2 27 04/20/2018 08:22 AM    BUN 21 08/30/2021 06:25 AM    CREATININE 1.1 08/30/2021 06:25 AM    GFRAA >60 08/30/2021 06:25 AM    GFRAA >60 01/29/2012 09:55 PM    AGRATIO 1.8 04/20/2018 08:22 AM    LABGLOM >60 08/30/2021 06:25 AM    GLUCOSE 105 04/20/2018 08:22 AM    PROT 7.1 04/20/2018 08:22 AM    PROT 7.3 01/29/2012 09:55 PM    CALCIUM 9.2 04/20/2018 08:22 AM    BILITOT <0.2 04/20/2018 08:22 AM    ALKPHOS 88 04/20/2018 08:22 AM    AST 16 04/20/2018 08:22 AM    ALT 19 04/20/2018 08:22 AM       POC Tests: No results for input(s): POCGLU, POCNA, POCK, POCCL, POCBUN, POCHEMO, POCHCT in the last 72 hours. Coags:   Lab Results   Component Value Date/Time    PROTIME 10.3 04/20/2018 08:22 AM    INR 0.91 04/20/2018 08:22 AM       HCG (If Applicable): No results found for: PREGTESTUR, PREGSERUM, HCG, HCGQUANT     ABGs: No results found for: PHART, PO2ART, JSP4URK, GZH3EOP, BEART, W4FYWMTK     Type & Screen (If Applicable):  No results found for: LABABO, LABRH    Drug/Infectious Status (If Applicable):  No results found for: HIV, HEPCAB    COVID-19 Screening (If Applicable): No results found for: COVID19        Anesthesia Evaluation  Patient summary reviewed no history of anesthetic complications:   Airway: Mallampati: II  TM distance: >3 FB   Neck ROM: full  Mouth opening: > = 3 FB   Dental:    (+) upper dentures      Pulmonary:normal exam  breath sounds clear to auscultation  (+) current smoker                           Cardiovascular:    (+) hypertension:, past MI:, CABG/stent (2008): no interval change,         Rhythm: regular  Rate: normal                    Neuro/Psych:   Negative Neuro/Psych ROS              GI/Hepatic/Renal: Neg GI/Hepatic/Renal ROS            Endo/Other: Negative Endo/Other ROS                    Abdominal:             Vascular: negative vascular ROS.          Other Findings:           Anesthesia Plan      general and regional     ASA 3     (Saphenous nerve block per surgeon request)  Induction: intravenous. Anesthetic plan and risks discussed with patient. Plan discussed with CRNA.     Attending anesthesiologist reviewed and agrees with Preprocedure content                Leonre Berry MD   10/19/2022

## 2022-10-19 NOTE — OP NOTE
Operative Note    Patient: Skip Pereira  YOB: 1955  MRN: [id number]    Date of Procedure: 10/19/2022    Pre-Op Diagnosis: RIGHT MEDIAL MALLEOLUS FRACTURE    Post-Op Diagnosis: Same       Procedure(s):  OPEN REDUCTION INTERNAL FIXATION RIGHT MEDIAL MALLEOLUS (23003)  Intraoperative x-ray interpretation 3 views right ankle (91478)    Surgeon(s):  Francheska Rubio MD    Assistant:  Surgical Assistant: Yaima Mast    Anesthesia: General, saphenous nerve block    Estimated Blood Loss (mL): Minimal    Complications: None    Specimens:   * No specimens in log *    Implants:  Implant Name Type Inv. Item Serial No.  Lot No. LRB No. Used Action   SCREW BNE L46MM DIA4MM THRD L15MM STD CANC S STL ST ROME[employer] - [id number]  SCREW BNE L46MM DIA4MM THRD L15MM STD CANC S STL ST [employer]OME[employer]  [employer] Zofia Hesham [id number] Right 2 Implanted         Drains: * No LDAs found *    Antibiotics: 2 g Ancef IV prior to incision    Findings: Displaced right medial malleolus fracture      INDICATIONS FOR OPERATION:      This 79-year-old male sustained a fall off a ladder where he sustained a displaced right medial malleolus fracture. Initially he saw my colleague Dr. Dillard Koyanagi and was referred over for surgical intervention. We discussed that this is an unstable injury and surgical fixation is recommended. We also discussed all risks, benefits, and alternatives to treatment. The patient chose to proceed with operative intervention. At no time were guarantees implied or stated. Risks and benefits of the procedure were fully explained in detail, including but not limited to infection, neurovascular injury, continued pain, arthritis, stiffness, need for further surgery, re-injury, DVT, PE, general risks of anesthesia and loss of limb or life. DESCRIPTION OF OPERATION:  The patient was seen in the holding area, the appropriate extremity marked with an indelible pen.    Regional anesthesia was administered by anesthesia. They were taken to the operative suite, identified, and placed on the OR table. General anesthesia was administered. All bony prominences were well-padded. An SCD was placed on the nonoperative side. A well-padded tourniquet was placed above the thigh. The left lower extremity was elevated, prepped with ChloraPrep from the toes to knee, draped in normal sterile fashion. The patient received 2g IV antibiotics prior to his incision. A timeout was performed per protocol. Fluoroscopic exam was performed prior to incision to demonstrate the fracture demonstrated instability of the medial malleolus. The bony landmarks were marked. The leg was elevated and exsanguinated and the tourniquet was inflated to 300 mmHg. ORIF Medial Malleolus     An incision was made medially , centered over the medial malleolus fracture. The incision was carried through subcutaneous tissue. The fracture was identified. Soft tissue debris was removed with a curette and rongeur and was copiously irrigated. The fracture was reduced with a reduction forceps and two K wires were then drilled under fluoroscopic visualization retrograde for the medial malleolus proximally. We then used a cannulated 2.7 mm drill to drill through the cortex of the bone. We then measured. Two cannulated 4.0 46 mm length self drilling self tapping screws were inserted perpendicular to the fracture site. Intraoperative fluoroscopic images were obtained. This confirmed satisfactory placement of hardware and a well-reduced mortise. The tourniquet was let down after 25 minutes. X-ray addendum: 3 views right ankle intraoperative fluoroscopic images confirmed that the mortise was reduced and stable to lateral stress and external rotation with hardware in appropriate position with the medial malleolus well reduced. The wounds were copiously irrigated.   Hemostasis was obtained with electrocautery, the subcutaneous tissue closed with 2-0 Vicryl, skin closed with staples. Sterile dressings and a well-padded splint were applied. The patient was awakened and taken to the postoperative area in stable condition. All sponge and needle counts were correct. Yaakov Silva MD  [facility]

## 2022-10-19 NOTE — ANESTHESIA POSTPROCEDURE EVALUATION
Department of Anesthesiology  Postprocedure Note    Patient: Benito Urban  MRN: [id number]  YOB: 1955  Date of evaluation: 10/19/2022      Procedure Summary     Date: 10/19/22 Room / Location: [address] [place] [address]    Anesthesia Start: 7958 Anesthesia Stop: 1636    Procedure: OPEN REDUCTION INTERNAL FIXATION RIGHT MEDIAL MALLEOLUS (Right: Ankle) Diagnosis:       Closed nondisplaced fracture of medial malleolus of right tibia, initial encounter      (RIGHT MEDIAL MALLEOLUS FRACTURE)    Surgeons: Librado Ordoñez MD Responsible Provider: Arturo Monsalve MD    Anesthesia Type: general, regional ASA Status: 3          Anesthesia Type: No value filed.     Connor Phase I: Connor Score: 10    Connor Phase II: Connor Score: 10      Anesthesia Post Evaluation    Patient location during evaluation: PACU  Patient participation: complete - patient participated  Level of consciousness: awake and alert  Airway patency: patent  Nausea & Vomiting: no nausea and no vomiting  Complications: no  Cardiovascular status: hemodynamically stable  Respiratory status: acceptable, room air and spontaneous ventilation  Hydration status: stable  Comments: --------------------            10/19/22               1410     --------------------   BP:     (!) 144/88   Pulse:      65       Resp:       15       Temp:                SpO2:               --------------------

## 2022-10-19 NOTE — ANESTHESIA PROCEDURE NOTES
Peripheral Block    Patient location during procedure: pre-op  Reason for block: post-op pain management and at surgeon's request  Start time: 10/19/2022 12:05 PM  Staffing  Performed: anesthesiologist   Anesthesiologist: Kyleigh Cuellar MD  Preanesthetic Checklist  Completed: patient identified, IV checked, site marked, risks and benefits discussed, surgical/procedural consents, equipment checked, pre-op evaluation, timeout performed, anesthesia consent given, oxygen available, monitors applied/VS acknowledged, fire risk safety assessment completed and verbalized and blood product R/B/A discussed and consented  Peripheral Block   Patient position: supine  Prep: ChloraPrep  Provider prep: sterile gloves and mask  Patient monitoring: continuous pulse ox, IV access, oxygen, responsive to questions and cardiac monitor  Block type: Saphenous  Laterality: right  Injection technique: single-shot  Guidance: ultrasound guided  Local infiltration: bupivacaine  Local infiltration: bupivacaine    Needle   Needle type: insulated echogenic nerve stimulator needle   Needle gauge: 22 G  Needle localization: ultrasound guidance  Needle length: 5 cm  Assessment   Injection assessment: negative aspiration for heme, no paresthesia on injection, local visualized surrounding nerve on ultrasound and no intravascular symptoms  Paresthesia pain: none  Slow fractionated injection: yes  Hemodynamics: stable  Real-time US image taken/store: yes  Outcomes: uncomplicated and patient tolerated procedure well    Additional Notes  Potential risks discussed, including rare risks of infection, bleeding, nerve damage, seizures (LAST). No significant nerve damage on exam was noted prior to block. All questions were answered, and patient agreed to proceed. Patient was mildly sedated to allow for meaningful conversation to be maintained during block placement. Block was placed using live ultrasound with no adverse events noted.   Medications Administered  bupivacaine (PF) 0.5 % - Perineural   20 mL - 10/19/2022 12:05:00 PM

## 2022-10-19 NOTE — H&P
+ dorsal pedis and posterior tibial pulse palpable, +sensation intact to light touch L4-S1, thigh and calf compartments soft and compressible, motor function intact ehl, df, pf.   Good cap refill <2 sec    + radial and ulnar pulse palpable, + sensation to light touch C5-T1, arm and forearm compartments soft and compressible, motor function intact to PIN, AIN, M, U, R nerves. Good cap refill <2 sec      DATA:      CBC with Differential:    Lab Results   Component Value Date/Time    WBC 7.1 04/20/2018 08:22 AM    RBC 4.32 04/20/2018 08:22 AM    HGB 14.9 04/20/2018 08:22 AM    HCT 44.1 04/20/2018 08:22 AM     04/20/2018 08:22 AM    .0 04/20/2018 08:22 AM    MCH 34.5 04/20/2018 08:22 AM    MCHC 33.8 04/20/2018 08:22 AM    RDW 13.5 04/20/2018 08:22 AM    SEGSPCT 57.5 01/29/2012 09:55 PM    LYMPHOPCT 31.4 04/20/2018 08:22 AM    MONOPCT 8.1 04/20/2018 08:22 AM    EOSPCT 1.9 01/29/2012 09:55 PM    BASOPCT 1.0 04/20/2018 08:22 AM    MONOSABS 0.6 04/20/2018 08:22 AM    LYMPHSABS 2.2 04/20/2018 08:22 AM    EOSABS 0.3 04/20/2018 08:22 AM    BASOSABS 0.1 04/20/2018 08:22 AM    DIFFTYPE Auto 01/29/2012 09:55 PM       CMP:    Lab Results   Component Value Date/Time     04/20/2018 08:22 AM    K 4.5 04/20/2018 08:22 AM     04/20/2018 08:22 AM    CO2 27 04/20/2018 08:22 AM    BUN 21 08/30/2021 06:25 AM    CREATININE 1.1 08/30/2021 06:25 AM    GFRAA >60 08/30/2021 06:25 AM    GFRAA >60 01/29/2012 09:55 PM    AGRATIO 1.8 04/20/2018 08:22 AM    LABGLOM >60 08/30/2021 06:25 AM    GLUCOSE 105 04/20/2018 08:22 AM    PROT 7.1 04/20/2018 08:22 AM    PROT 7.3 01/29/2012 09:55 PM    CALCIUM 9.2 04/20/2018 08:22 AM    BILITOT <0.2 04/20/2018 08:22 AM    ALKPHOS 88 04/20/2018 08:22 AM    AST 16 04/20/2018 08:22 AM    ALT 19 04/20/2018 08:22 AM       Radiology: 3 views right ankle from 10/7/22 moderately displaced medial malleolus fracture. Mortise is aligned. No other acute fractures noted.     /74   Pulse 57   Temp 96.8 °F (36 °C) (Temporal)   Resp 20   Ht 5' 7\" (1.702 m)   Wt 155 lb (70.3 kg)   SpO2 97%   BMI 24.28 kg/m²     IMPRESSION/RECOMMENDATIONS:    Assessment: 52-year-old male with a right ankle medial malleolus fracture with displacement    Plan:  1) we lengthy discussion about his condition. We discussed the treatment indications. He does have a displaced medial malleolus fracture. We discussed surgery in the situation. Discussed the risks, benefits and alternatives. We discussed the risk of infection, nonunion, malunion, hardware complication, nerve or vascular injury, loss of limb or loss of life and he wished to move forward with surgery. 2) we discussed postoperative recovery time and need for time for limited weightbearing. We discussed he will be in a splint postoperatively  3) plan for surgery today for ORIF right ankle medial malleolus    Yaakov Silva MD

## 2022-10-19 NOTE — BRIEF OP NOTE
Brief Postoperative Note      Patient: Sakshi Singh  YOB: 1955  MRN: [id number]    Date of Procedure: 10/19/2022    Pre-Op Diagnosis: RIGHT MEDIAL MALLEOLUS FRACTURE    Post-Op Diagnosis: Same       Procedure(s):  OPEN REDUCTION INTERNAL FIXATION RIGHT MEDIAL MALLEOLUS (34498)  Intraoperative x-ray interpretation 3 views right ankle (54563)    Surgeon(s):  Wiley Turcios MD    Assistant:  Surgical Assistant: Charlotte Kumar    Anesthesia: General, saphenous nerve block    Estimated Blood Loss (mL): Minimal    Complications: None    Specimens:   * No specimens in log *    Implants:  Implant Name Type Inv.  Item Serial No.  Lot No. LRB No. Used Action   SCREW BNE L46MM DIA4MM THRD L15MM STD Jerlyn Cone ST [facility]OME[facility] - [id number]  SCREW BNE L46MM DIA4MM THRD L15MM STD CANC S STL [facility]OME[facility]  [employer]UB[employer] NE[employer] [id number] Right 2 Implanted         Drains: * No LDAs found *    Antibiotics: 2 g Ancef IV prior to incision    Findings: Displaced right medial malleolus fracture    Electronically signed by Wiley Turcios MD on 10/19/2022 at 1:48 PM

## 2022-11-01 NOTE — PROGRESS NOTES
PRE OP INSTRUCTION SHEET   1. Do not eat or drink anything after 12 midnight  prior to surgery. This includes no water, chewing gum or mints. 2. Take the following pills will a small sip of water (see MAR)                                        3. Aspirin, Ibuprofen, Advil, Naproxen, Vitamin E, fish oil and other Anti-inflammatory products should be stopped for 5 days before surgery or as directed by your physician. 4. Check with your Doctor regarding stopping Plavix, Coumadin, Lovenox, Fragmin or other blood thinners   5. Do not smoke, and do not drink any alcoholic beverages 24 hours prior to surgery. This includes NA Beer. 6. You may brush your teeth and gargle the morning of surgery. DO NOT SWALLOW WATER   7. You MUST make arrangements for a responsible adult to take you home after your surgery. You will not be allowed to leave alone or drive yourself home. It is strongly suggested someone stay with you the first 24 hrs. Your surgery will be cancelled if you do not have a ride home. 8. A parent/legal guardian must accompany a child scheduled for surgery and plan to stay at the hospital until the child is discharged. Please do not bring other children with you. 9. Please wear simple, loose fitting clothing to the hospital.  Rosario Pruettitri not bring valuables (money, credit cards, checkbooks, etc.) Do not wear any makeup (including no eye makeup) or nail polish on your fingers or toes. 10. DO NOT wear any jewelry or piercings on day of surgery. All body piercing jewelry must be removed. 11. If you have dentures,glasses, or contacts they will be removed before going to the OR; we will provide you a container. 12. Please see your family doctor/and cardiologist for a history & physical and/or concerning medications. Bring any test results/reports from your physician's office. Have history and labs faxed to [phone].  Remember to bring Blood Bank bracelet on the day of surgery. 14. If you have a Living Will and Durable Power of  for Healthcare, please bring in a copy. 13. Notify your Surgeon if you develop any illness between now and surgery  time, cough, cold, fever, sore throat, nausea, vomiting, etc.  Please notify your surgeon if you experience dizziness, shortness of breath or blurred vision between now & the time of your surgery   16. DO NOT shave your operative site 96 hours prior to surgery. For face & neck surgery, men may use an electric razor 48 hours prior to surgery. 17. Shower with _x__Antibacterial soap (x_chlorhexidine for total joint  Pt's) shower two times before surgery.(the morning of and the night before. 18. To provide excellent care visitors will be limited to one in the room at any given time.   Please call pre admission testing if you any further questions [phone] or [phone]

## 2022-11-04 ENCOUNTER — HOSPITAL ENCOUNTER (OUTPATIENT)
Age: 67
Setting detail: OUTPATIENT SURGERY
Discharge: HOME | End: 2022-11-04
Attending: UROLOGY | Admitting: UROLOGY
Payer: MEDICARE

## 2022-11-04 ENCOUNTER — ANESTHESIA EVENT (OUTPATIENT)
Dept: OPERATING ROOM | Age: 67
End: 2022-11-04
Payer: MEDICARE

## 2022-11-04 ENCOUNTER — ANESTHESIA (OUTPATIENT)
Dept: OPERATING ROOM | Age: 67
End: 2022-11-04
Payer: MEDICARE

## 2022-11-04 VITALS
OXYGEN SATURATION: 99 % | SYSTOLIC BLOOD PRESSURE: 110 MMHG | HEART RATE: 52 BPM | TEMPERATURE: 96.9 F | DIASTOLIC BLOOD PRESSURE: 79 MMHG | BODY MASS INDEX: 24.33 KG/M2 | WEIGHT: 155 LBS | HEIGHT: 67 IN | RESPIRATION RATE: 15 BRPM

## 2022-11-04 PROCEDURE — 3600000014 HC SURGERY LEVEL 4 ADDTL 15MIN: Performed by: UROLOGY

## 2022-11-04 PROCEDURE — 2709999900 HC NON-CHARGEABLE SUPPLY: Performed by: UROLOGY

## 2022-11-04 PROCEDURE — 2500000003 HC RX 250 WO HCPCS: Performed by: ANESTHESIOLOGY

## 2022-11-04 PROCEDURE — 7100000011 HC PHASE II RECOVERY - ADDTL 15 MIN: Performed by: UROLOGY

## 2022-11-04 PROCEDURE — 7100000010 HC PHASE II RECOVERY - FIRST 15 MIN: Performed by: UROLOGY

## 2022-11-04 PROCEDURE — 6370000000 HC RX 637 (ALT 250 FOR IP): Performed by: UROLOGY

## 2022-11-04 PROCEDURE — 6360000002 HC RX W HCPCS: Performed by: NURSE ANESTHETIST, CERTIFIED REGISTERED

## 2022-11-04 PROCEDURE — 2580000003 HC RX 258: Performed by: UROLOGY

## 2022-11-04 PROCEDURE — 2580000003 HC RX 258: Performed by: ANESTHESIOLOGY

## 2022-11-04 PROCEDURE — 3700000001 HC ADD 15 MINUTES (ANESTHESIA): Performed by: UROLOGY

## 2022-11-04 PROCEDURE — 6360000002 HC RX W HCPCS: Performed by: UROLOGY

## 2022-11-04 PROCEDURE — 3600000004 HC SURGERY LEVEL 4 BASE: Performed by: UROLOGY

## 2022-11-04 PROCEDURE — 3700000000 HC ANESTHESIA ATTENDED CARE: Performed by: UROLOGY

## 2022-11-04 RX ORDER — LABETALOL HYDROCHLORIDE 5 MG/ML
5 INJECTION, SOLUTION INTRAVENOUS EVERY 10 MIN PRN
Status: DISCONTINUED | OUTPATIENT
Start: 2022-11-04 | End: 2022-11-04 | Stop reason: HOSPADM

## 2022-11-04 RX ORDER — SODIUM CHLORIDE 0.9 % (FLUSH) 0.9 %
5-40 SYRINGE (ML) INJECTION EVERY 12 HOURS SCHEDULED
Status: DISCONTINUED | OUTPATIENT
Start: 2022-11-04 | End: 2022-11-04 | Stop reason: HOSPADM

## 2022-11-04 RX ORDER — SODIUM CHLORIDE 9 MG/ML
INJECTION, SOLUTION INTRAVENOUS PRN
Status: DISCONTINUED | OUTPATIENT
Start: 2022-11-04 | End: 2022-11-04 | Stop reason: HOSPADM

## 2022-11-04 RX ORDER — ONDANSETRON 2 MG/ML
4 INJECTION INTRAMUSCULAR; INTRAVENOUS
Status: DISCONTINUED | OUTPATIENT
Start: 2022-11-04 | End: 2022-11-04 | Stop reason: HOSPADM

## 2022-11-04 RX ORDER — DIPHENHYDRAMINE HYDROCHLORIDE 50 MG/ML
12.5 INJECTION INTRAMUSCULAR; INTRAVENOUS
Status: DISCONTINUED | OUTPATIENT
Start: 2022-11-04 | End: 2022-11-04 | Stop reason: HOSPADM

## 2022-11-04 RX ORDER — OXYCODONE HYDROCHLORIDE 5 MG/1
10 TABLET ORAL PRN
Status: DISCONTINUED | OUTPATIENT
Start: 2022-11-04 | End: 2022-11-04 | Stop reason: HOSPADM

## 2022-11-04 RX ORDER — PROPOFOL 10 MG/ML
INJECTION, EMULSION INTRAVENOUS PRN
Status: DISCONTINUED | OUTPATIENT
Start: 2022-11-04 | End: 2022-11-04 | Stop reason: SDUPTHER

## 2022-11-04 RX ORDER — SODIUM CHLORIDE 0.9 % (FLUSH) 0.9 %
5-40 SYRINGE (ML) INJECTION PRN
Status: DISCONTINUED | OUTPATIENT
Start: 2022-11-04 | End: 2022-11-04 | Stop reason: HOSPADM

## 2022-11-04 RX ORDER — SODIUM CHLORIDE, SODIUM LACTATE, POTASSIUM CHLORIDE, CALCIUM CHLORIDE 600; 310; 30; 20 MG/100ML; MG/100ML; MG/100ML; MG/100ML
INJECTION, SOLUTION INTRAVENOUS CONTINUOUS
Status: DISCONTINUED | OUTPATIENT
Start: 2022-11-04 | End: 2022-11-04 | Stop reason: HOSPADM

## 2022-11-04 RX ORDER — OXYCODONE HYDROCHLORIDE 5 MG/1
5 TABLET ORAL PRN
Status: DISCONTINUED | OUTPATIENT
Start: 2022-11-04 | End: 2022-11-04 | Stop reason: HOSPADM

## 2022-11-04 RX ORDER — MAGNESIUM HYDROXIDE 1200 MG/15ML
LIQUID ORAL PRN
Status: DISCONTINUED | OUTPATIENT
Start: 2022-11-04 | End: 2022-11-04 | Stop reason: ALTCHOICE

## 2022-11-04 RX ORDER — MEPERIDINE HYDROCHLORIDE 25 MG/ML
12.5 INJECTION INTRAMUSCULAR; INTRAVENOUS; SUBCUTANEOUS EVERY 5 MIN PRN
Status: DISCONTINUED | OUTPATIENT
Start: 2022-11-04 | End: 2022-11-04 | Stop reason: HOSPADM

## 2022-11-04 RX ORDER — LIDOCAINE HYDROCHLORIDE 20 MG/ML
JELLY TOPICAL PRN
Status: DISCONTINUED | OUTPATIENT
Start: 2022-11-04 | End: 2022-11-04 | Stop reason: ALTCHOICE

## 2022-11-04 RX ORDER — LIDOCAINE HYDROCHLORIDE 10 MG/ML
0.3 INJECTION, SOLUTION EPIDURAL; INFILTRATION; INTRACAUDAL; PERINEURAL
Status: COMPLETED | OUTPATIENT
Start: 2022-11-04 | End: 2022-11-04

## 2022-11-04 RX ADMIN — PROPOFOL 20 MG: 10 INJECTION, EMULSION INTRAVENOUS at 14:28

## 2022-11-04 RX ADMIN — SODIUM CHLORIDE, POTASSIUM CHLORIDE, SODIUM LACTATE AND CALCIUM CHLORIDE: 600; 310; 30; 20 INJECTION, SOLUTION INTRAVENOUS at 14:15

## 2022-11-04 RX ADMIN — PROPOFOL 20 MG: 10 INJECTION, EMULSION INTRAVENOUS at 14:36

## 2022-11-04 RX ADMIN — CEFAZOLIN 2000 MG: 2 INJECTION, POWDER, FOR SOLUTION INTRAMUSCULAR; INTRAVENOUS at 14:20

## 2022-11-04 RX ADMIN — PROPOFOL 20 MG: 10 INJECTION, EMULSION INTRAVENOUS at 14:32

## 2022-11-04 RX ADMIN — PROPOFOL 100 MG: 10 INJECTION, EMULSION INTRAVENOUS at 14:26

## 2022-11-04 RX ADMIN — PROPOFOL 20 MG: 10 INJECTION, EMULSION INTRAVENOUS at 14:38

## 2022-11-04 RX ADMIN — LIDOCAINE HYDROCHLORIDE 3 ML: 10 INJECTION, SOLUTION EPIDURAL; INFILTRATION; INTRACAUDAL; PERINEURAL at 14:26

## 2022-11-04 RX ADMIN — PROPOFOL 20 MG: 10 INJECTION, EMULSION INTRAVENOUS at 14:34

## 2022-11-04 RX ADMIN — PROPOFOL 20 MG: 10 INJECTION, EMULSION INTRAVENOUS at 14:30

## 2022-11-04 RX ADMIN — PROPOFOL 20 MG: 10 INJECTION, EMULSION INTRAVENOUS at 14:40

## 2022-11-04 ASSESSMENT — PAIN - FUNCTIONAL ASSESSMENT
PAIN_FUNCTIONAL_ASSESSMENT: 0-10
PAIN_FUNCTIONAL_ASSESSMENT: PREVENTS OR INTERFERES SOME ACTIVE ACTIVITIES AND ADLS

## 2022-11-04 ASSESSMENT — PAIN DESCRIPTION - DESCRIPTORS: DESCRIPTORS: DISCOMFORT

## 2022-11-04 NOTE — OP NOTE
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 and  now presents for fu for surveillance cysto. UA neg. Description of Procedure:   Informed consent was obtained prior to the procedure. The patient was positioned in dorsal lithotomy in the operating room. The genitalia was then prepped and draped in the usual sterile fashion. A 22 Mauritian cystoscope was then inserted through the urethra up into the bladder. The bladder was examined with a 30° and 70° lens. The patient tolerated the procedure without any complications. Findings:   External genitalia was normal without lesions  Urethra: Within normal limits  Bladder: Within normal limits. Mod to severe trabeculation. No tumors, stones were identified. Bilateral ureteral orifices were normal with clear urine efflux.   Prostate:50grm a bit of high bladder neck  STEF: nl    Follow up:  -In 12 months for another cystoscopy

## 2022-11-04 NOTE — H&P
H&P reviewed. The patient was examined and there are no changes to the H&P. Hp from hospital notes, office notes, or printed at bedside. See all documents including the scanned Documents. These were reviewed with the patient and I examined the patient. There was no change. The surgical site was confirmed by the patient and me. Vitals:    11/04/22 1259   BP: 134/74   Pulse: 64   Resp: 16   Temp: 98.6 °F (37 °C)   SpO2: 100%           Plan: The risks, benefits, expected outcome, and alternative to the recommended procedure have been discussed with the patient. Patient understands and wants to proceed with the procedure. All questions answered.

## 2022-11-04 NOTE — DISCHARGE INSTRUCTIONS
[facility]      Cystoscopy Discharge Instructions    You may experience : Burning sensation when you void     A feeling of a need to go to the bathroom frequently     Your urine may be blood tinged    These symptoms should be relieved within a few hours as you increase the amounts of fluids you drink and the number of times that you empty your bladder. We recommended that you drink plenty of fluid a few days after surgery. No strenuous activities until you talk to your doctor. You may feel light headed up to 24 hours after anesthesia. You should not do the following for the next 24 hours. Drive a car, operate machinery or power tools     Drink any alcoholic drinks (not even beer or wine)     Make any important decisions, i.e., signing important papers. It is recommended that you begin with clear liquids and/or light foods. If you  Are not nauseated, progress to your normal diet. I you are unable to urinate you should call your surgeon.       Noemí Vuong. Mani Hanks

## 2022-11-04 NOTE — ANESTHESIA POSTPROCEDURE EVALUATION
Department of Anesthesiology  Postprocedure Note    Patient: Cayla Reeves  MRN: [id number]  YOB: 1955  Date of evaluation: 11/4/2022      Procedure Summary     Date: 11/04/22 Room / Location: [place] [address] / [facility]    Anesthesia Start: 0841 Anesthesia Stop: 9716    Procedure: CYSTOSCOPY, (Bladder) Diagnosis:       Malignant neoplasm of lateral wall of urinary bladder (HCC)      (Malignant neoplasm of lateral wall of urinary bladder ([facility] [address] [C67.2])    Surgeons: Carolyn Hedirck MD Responsible Provider: Kumar Burks MD    Anesthesia Type: general ASA Status: 2          Anesthesia Type: No value filed.     Connor Phase I: Connor Score: 10    Connor Phase II: Connor Score: 9      Anesthesia Post Evaluation    Comments: Postoperative Anesthesia Note    Name:    Cayla Reeves  MRN:      [id number]    Patient Vitals in the past 12 hrs:  11/04/22 1500, BP:110/79  11/04/22 1455, BP:112/70, SpO2:99 %  11/04/22 1450, BP:100/61, SpO2:98 %  11/04/22 1449, BP:(!) 102/54, Temp:96.9 °F (36.1 °C), Temp src:Temporal, Pulse:52, Resp:15, SpO2:100 %  11/04/22 1259, BP:134/74, Temp:98.6 °F (37 °C), Temp src:Temporal, Pulse:64, Resp:16, SpO2:100 %, Height:5' 7\" (1.702 m), Weight:155 lb (70.3 kg)     LABS:    CBC  Lab Results       Component                Value               Date/Time                  WBC                      7.1                 04/20/2018 08:22 AM        HGB                      14.9                04/20/2018 08:22 AM        HCT                      44.1                04/20/2018 08:22 AM        PLT                      220                 04/20/2018 08:22 AM   RENAL  Lab Results       Component                Value               Date/Time                  NA                       138                 04/20/2018 08:22 AM        K                        4.5                 04/20/2018 08:22 AM        CL                       103                 04/20/2018 08:22 AM        CO2 27                  04/20/2018 08:22 AM        BUN                      21 (H)              08/30/2021 06:25 AM        CREATININE               1.1                 08/30/2021 06:25 AM        GLUCOSE                  105 (H)             04/20/2018 08:22 AM   HARYR  Lab Results       Component                Value               Date/Time                  PROTIME                  10.3                04/20/2018 08:22 AM        INR                      0.91                04/20/2018 08:22 AM     Intake & Output:  @62NWEX@    Nausea & Vomiting:  No    Level of Consciousness:  Awake    Pain Assessment:  Adequate analgesia    Anesthesia Complications:  No apparent anesthetic complications    SUMMARY      Vital signs stable  OK to discharge from Stage I post anesthesia care.   Care transferred from Anesthesiology department on discharge from perioperative area
